# Patient Record
Sex: FEMALE | Race: WHITE | HISPANIC OR LATINO | URBAN - METROPOLITAN AREA
[De-identification: names, ages, dates, MRNs, and addresses within clinical notes are randomized per-mention and may not be internally consistent; named-entity substitution may affect disease eponyms.]

---

## 2022-03-23 ENCOUNTER — OFFICE VISIT (OUTPATIENT)
Dept: URGENT CARE | Facility: CLINIC | Age: 37
End: 2022-03-23
Payer: COMMERCIAL

## 2022-03-23 VITALS
RESPIRATION RATE: 16 BRPM | TEMPERATURE: 97.3 F | SYSTOLIC BLOOD PRESSURE: 117 MMHG | HEART RATE: 91 BPM | OXYGEN SATURATION: 100 % | BODY MASS INDEX: 21.51 KG/M2 | WEIGHT: 126 LBS | HEIGHT: 64 IN | DIASTOLIC BLOOD PRESSURE: 75 MMHG

## 2022-03-23 DIAGNOSIS — H61.23 BILATERAL HEARING LOSS DUE TO CERUMEN IMPACTION: Primary | ICD-10-CM

## 2022-03-23 PROCEDURE — 99203 OFFICE O/P NEW LOW 30 MIN: CPT | Performed by: FAMILY MEDICINE

## 2022-03-23 RX ORDER — SUMATRIPTAN 25 MG/1
25 TABLET, FILM COATED ORAL ONCE AS NEEDED
COMMUNITY

## 2022-03-23 RX ORDER — NORETHINDRONE ACETATE AND ETHINYL ESTRADIOL 1; 5 MG/1; UG/1
TABLET ORAL DAILY
COMMUNITY

## 2022-03-23 NOTE — PROGRESS NOTES
[facility]        NAME: Lucian Sanders is a 43 y o  female  : 1985    MRN: [id number]  DATE: 2022  TIME: 9:35 AM    Assessment and Plan   Bilateral hearing loss due to cerumen impaction [H61 23]  1  Bilateral hearing loss due to cerumen impaction       Ear cerumen removal    Date/Time: 3/23/2022 9:36 AM  Performed by: Adam Milian MD  Authorized by: Adam Milian MD   Universal Protocol:  Consent: Verbal consent obtained  Risks and benefits: risks, benefits and alternatives were discussed  Consent given by: patient  Time out: Immediately prior to procedure a "time out" was called to verify the correct patient, procedure, equipment, support staff and site/side marked as required  Patient understanding: patient states understanding of the procedure being performed  Patient consent: the patient's understanding of the procedure matches consent given  Required items: required blood products, implants, devices, and special equipment available  Patient identity confirmed: verbally with patient      Patient location:  Clinic  Procedure details:     Local anesthetic:  None    Procedure type: irrigation only      Approach:  External  Post-procedure details:     Complication:  None    Hearing quality:  Improved    Patient tolerance of procedure: Tolerated with difficulty (Experienced some dizziness )  Comments:      Advised on using Debrox in the future  Patient Instructions     Follow up with PCP in 3-5 days  Proceed to  ER if symptoms worsen  Chief Complaint     Chief Complaint   Patient presents with    Earache     R ear painful, itchy pressure for 3 days  Hearing is diminished in the ear  History of Present Illness       26-year-old female presents today with 3 days of right ear discomfort  Is concern for possible cerumen impaction  Denies any dizziness, fever, chills or headaches        Review of Systems   Review of Systems   Constitutional: Negative for chills and fever  HENT: Positive for ear pain and sneezing  Negative for congestion, rhinorrhea and sore throat  Respiratory: Negative for cough, shortness of breath and wheezing  Cardiovascular: Negative for chest pain  Gastrointestinal: Negative for abdominal pain and nausea  Musculoskeletal: Negative for arthralgias  Skin: Negative for rash  Allergic/Immunologic: Positive for environmental allergies  Neurological: Negative for dizziness and headaches  Current Medications       Current Outpatient Medications:     norethindrone-ethinyl estradiol (FEMHRT 1/5) 1-5 MG-MCG TABS, Take by mouth daily, Disp: , Rfl:     SUMAtriptan (IMITREX) 25 mg tablet, Take 25 mg by mouth once as needed for migraine, Disp: , Rfl:     Current Allergies     Allergies as of 03/23/2022    (No Known Allergies)            The following portions of the patient's history were reviewed and updated as appropriate: allergies, current medications, past family history, past medical history, past social history, past surgical history and problem list      Past Medical History:   Diagnosis Date    Allergic rhinitis     Anxiety        Past Surgical History:   Procedure Laterality Date    NO PAST SURGERIES         History reviewed  No pertinent family history  Medications have been verified  Objective   /75   Pulse 91   Temp (!) 97 3 °F (36 3 °C)   Resp 16   Ht 5' 4" (1 626 m)   Wt 57 2 kg (126 lb)   LMP 03/16/2022   SpO2 100%   BMI 21 63 kg/m²   Patient's last menstrual period was 03/16/2022  Physical Exam     Physical Exam  Vitals and nursing note reviewed  Constitutional:       General: She is in acute distress  Appearance: Normal appearance  She is normal weight  She is not ill-appearing, toxic-appearing or diaphoretic  HENT:      Head: Normocephalic and atraumatic  Right Ear: Ear canal and external ear normal  There is impacted cerumen        Left Ear: Ear canal and external ear normal  There is impacted cerumen  Eyes:      General:         Right eye: No discharge  Left eye: No discharge  Conjunctiva/sclera: Conjunctivae normal    Pulmonary:      Effort: Pulmonary effort is normal    Skin:     General: Skin is warm  Findings: No erythema  Neurological:      General: No focal deficit present  Mental Status: She is alert and oriented to person, place, and time  Psychiatric:         Mood and Affect: Mood normal          Behavior: Behavior normal          Thought Content:  Thought content normal          Judgment: Judgment normal

## 2022-03-28 ENCOUNTER — OFFICE VISIT (OUTPATIENT)
Dept: OTOLARYNGOLOGY | Facility: CLINIC | Age: 37
End: 2022-03-28
Payer: COMMERCIAL

## 2022-03-28 VITALS
HEIGHT: 64 IN | DIASTOLIC BLOOD PRESSURE: 74 MMHG | SYSTOLIC BLOOD PRESSURE: 112 MMHG | OXYGEN SATURATION: 100 % | WEIGHT: 125 LBS | TEMPERATURE: 97.9 F | HEART RATE: 98 BPM | BODY MASS INDEX: 21.34 KG/M2

## 2022-03-28 DIAGNOSIS — H61.22 LEFT EAR IMPACTED CERUMEN: Primary | ICD-10-CM

## 2022-03-28 PROCEDURE — 69210 REMOVE IMPACTED EAR WAX UNI: CPT | Performed by: NURSE PRACTITIONER

## 2022-03-28 PROCEDURE — 99203 OFFICE O/P NEW LOW 30 MIN: CPT | Performed by: NURSE PRACTITIONER

## 2022-03-28 NOTE — ASSESSMENT & PLAN NOTE
On exam noted left cerumen impaction and unable to fully view tympanic membrane  Cerumen impaction removed left eac with irrigation and suction, pt tolerated procedure well  Upon removal, improved hearing and decreased clogged sensation of bilateral ears  Discussed routine cerumen care including avoidance of q-tips and cerumen softeners  Debrox (ear wax softener) once every 2 to 3 months  Hydrocortisone cream or coconut oil to ears as needed for itching  Encourage ongoing follow up prn to monitor for cerumen and hearing  Audiogram if symptoms worsen

## 2022-03-28 NOTE — PROGRESS NOTES
Assessment/Plan:    Left ear impacted cerumen    On exam noted left cerumen impaction and unable to fully view tympanic membrane  Cerumen impaction removed left eac with irrigation and suction, pt tolerated procedure well  Upon removal, improved hearing and decreased clogged sensation of bilateral ears  Discussed routine cerumen care including avoidance of q-tips and cerumen softeners  Debrox (ear wax softener) once every 2 to 3 months  Hydrocortisone cream or coconut oil to ears as needed for itching  Encourage ongoing follow up prn to monitor for cerumen and hearing  Audiogram if symptoms worsen  Diagnoses and all orders for this visit:    Left ear impacted cerumen    Other orders  -     Ear cerumen removal          Subjective:      Patient ID: Antoni Christian is a 43 y o  female  Presents today as a new patient due to ear concerns  About a week ago both ears  Clogged, itching, ringing  Right worse than left  Sought treatment at urgent care  Ears cleaned during that visit  During cleaning of left ear became dizzy and had pain  Right ear is improved after cleaning but left remains bothersome  No treatment with ear drops after visit  No current ear pain  No otorrhea  No current dizziness  The following portions of the patient's history were reviewed and updated as appropriate: allergies, current medications, past family history, past medical history, past social history, past surgical history and problem list     Review of Systems   Constitutional: Negative  HENT: Negative for congestion, ear discharge, ear pain, hearing loss, nosebleeds, postnasal drip, rhinorrhea, sinus pressure, sinus pain, sore throat, tinnitus and voice change  Ear blocked     Eyes: Negative  Respiratory: Negative for chest tightness and shortness of breath  Cardiovascular: Negative  Gastrointestinal: Negative  Endocrine: Negative  Musculoskeletal: Negative      Skin: Negative for color change  Neurological: Negative for dizziness, numbness and headaches  Psychiatric/Behavioral: Negative  Objective:      /74 (BP Location: Left arm, Patient Position: Sitting, Cuff Size: Adult)   Pulse 98   Temp 97 9 °F (36 6 °C) (Temporal)   Ht 5' 4" (1 626 m)   Wt 56 7 kg (125 lb)   LMP 03/16/2022   SpO2 100%   BMI 21 46 kg/m²          Physical Exam  Constitutional:       Appearance: She is well-developed  HENT:      Head: Normocephalic  Right Ear: Hearing, tympanic membrane, ear canal and external ear normal  No decreased hearing noted  No drainage or tenderness  Tympanic membrane is not perforated or erythematous  Left Ear: Hearing, tympanic membrane, ear canal and external ear normal  No decreased hearing noted  No drainage or tenderness  There is impacted cerumen  Tympanic membrane is not perforated or erythematous  Nose: Nose normal  No nasal deformity or septal deviation  Mouth/Throat:      Mouth: Mucous membranes are not pale and not dry  No oral lesions  Dentition: Normal dentition  Pharynx: Uvula midline  No oropharyngeal exudate  Neck:      Trachea: No tracheal deviation  Cardiovascular:      Rate and Rhythm: Normal rate  Pulmonary:      Effort: Pulmonary effort is normal  No accessory muscle usage or respiratory distress  Musculoskeletal:      Right shoulder: Normal range of motion  Cervical back: Full passive range of motion without pain, normal range of motion and neck supple  Lymphadenopathy:      Cervical: No cervical adenopathy  Skin:     General: Skin is warm and dry  Neurological:      Mental Status: She is alert and oriented to person, place, and time  Cranial Nerves: No cranial nerve deficit  Sensory: No sensory deficit  Psychiatric:         Behavior: Behavior is cooperative         Ear cerumen removal    Date/Time: 3/28/2022 10:45 AM  Performed by: CAROLINA Dale  Authorized by: Shea Holm Universal Protocol:  Consent: Verbal consent obtained  Risks and benefits: risks, benefits and alternatives were discussed  Consent given by: patient  Patient understanding: patient states understanding of the procedure being performed      Patient location:  Clinic  Procedure details:     Local anesthetic:  None    Location:  L ear    Procedure type: irrigation with instrumentation      Instrumentation: suction      Approach:  External  Post-procedure details:     Complication:  None    Hearing quality:  Normal    Patient tolerance of procedure:   Tolerated well, no immediate complications

## 2022-03-28 NOTE — PATIENT INSTRUCTIONS
Debrox (ear wax softener) once every 2 to 3 months  Hydrocortisone cream or coconut oil to ears as needed for itching

## 2022-07-05 ENCOUNTER — OFFICE VISIT (OUTPATIENT)
Dept: PHYSICAL THERAPY | Facility: CLINIC | Age: 37
End: 2022-07-05
Payer: COMMERCIAL

## 2022-07-05 DIAGNOSIS — M54.12 CERVICAL RADICULOPATHY: Primary | ICD-10-CM

## 2022-07-05 DIAGNOSIS — M25.512 ACUTE PAIN OF LEFT SHOULDER: ICD-10-CM

## 2022-07-05 PROCEDURE — 97161 PT EVAL LOW COMPLEX 20 MIN: CPT

## 2022-07-05 PROCEDURE — 97161 PT EVAL LOW COMPLEX 20 MIN: CPT | Performed by: PHYSICAL MEDICINE & REHABILITATION

## 2022-07-05 NOTE — PROGRESS NOTES
PT Evaluation     Today's date: 2022  Patient name: Abiel Ham  : 1985  MRN: [id number]  Referring provider: No ref  provider found  Dx: No diagnosis found  Assessment  Assessment details: Abiel Ham is an 42 y o  female  arriving to clinic with complaints of left shoulder pain  Patient's primary symptoms are present along medial scapular border and intermittently into posterior capsule of left shoulder  Patient not present with any significant weakness of R/L UEs (-) special test clusters of left shoulder, and denies any numbness or paraesthesia  Symptoms elicited with cervical extension, and right cervical lateral flexion  Patient did have a reduction of symptoms with repeated cervical retraction, and manual traction of cervical spine  Mid/lower trap with moderate weakness bilaterally, (+) radial nerve tension in LLE present  Due to current deficits, patient is limited functionally with recreational activities, work-related activities, lifting/carrying  Patient has been educated in home exercise program and plan of care  Patient would benefit from skilled physical therapy services to address their aforementioned functional limitations and progress towards prior level of function and independence with home exercise program     Impairments: activity intolerance, lacks appropriate home exercise program, pain with function, scapular dyskinesis, poor posture  and poor body mechanics    Symptom irritability: moderate  Goals  Short Term Goals:  4 weeks  1  Initiate and advance home exercise program to self manage symptoms  2  Improve postural awareness and demonstrate self postural correction  3  Patient can work for 30 minutes without increased pain in left shoulder  4  Patient to return to yoga with moderation when necessary    Long Term Goals:  12 weeks  1  Patient to exhibit full independence with home exercise program for symptoms relief and prevention   2   Patient to improve mid/lower trap strength to 4/5 bilaterally  3  Patient to return to yoga without restriction  4  Patient to improve cervical mobility to no deficit without increased pain  Plan  Patient would benefit from: skilled physical therapy  Planned modality interventions: TENS, unattended electrical stimulation, thermotherapy: hydrocollator packs and cryotherapy  Planned therapy interventions: abdominal trunk stabilization, flexibility, functional ROM exercises, home exercise program, therapeutic exercise, therapeutic activities, stretching, strengthening, self care, postural training, patient education, neuromuscular re-education, massage, manual therapy and joint mobilization  Frequency: 2x week  Plan of Care expiration date: 2022  Treatment plan discussed with: patient        Subjective Evaluation    History of Present Illness  Mechanism of injury: Patient reports about a month ago she was trying to move heavy bins in her house, at one instance patient reports she lifted one heavy box causing increased posterior shoulder pain  Patient notes symptoms improved a bit since the incident  Patient notes she did not follow up with her MD, but did has tried yoga stretches for her shoulder and that helped a little bit  Patient notes symptoms are worst in shoulder when weight bearing position, and with reaching forward  Patient notes she works remotely and does not feel any increased pain when doing computer work            Not a recurrent problem   Pain  Current pain ratin  At best pain ratin  At worst pain ratin  Location: Left posterior shoulder   Quality: discomfort and dull ache  Progression: improved    Hand dominance: right      Diagnostic Tests  No diagnostic tests performed  Treatments  No previous or current treatments  Patient Goals  Patient goals for therapy: decreased pain  Patient goal: Return to full yoga        Objective  (* denotes pain)    C-SPINE:  Increased symptoms with cervical extension and right cervical side bending  Decreased pain into left medial scapular boarder with repeated cervical retractions  (+) radial nerve tension test  (+) cervical traction test    MMT:    Right  Left    Shoulder flexion:  5/5  5/5  Shoulder abduction:  5/5  5/5*  Shoulder extension:  5/5  5/5  Shoulder IR neutral:  5/5  5/5*  Shoulder ER neutral:  5/5  5/5  Mid traps:   3+/5  3+/5*  Lower traps:    3+/5  3+/5    AROM:    Right  Left    Shoulder flexion:  180  180  Shoulder abduction:  180  180  Shoulder extension:  60  60  Shoulder IR:    T5  T7  Shoulder ER:   T1  T1    EDEMA:  Not present in left shoulder       PALPATION:  (+) tenderness to palpation to left medial scapular boarder     FLEXIBILITY:  No significant flexibility deficits    SPECIAL TESTS:    Impingement cluster: (3/5 or 1-3)  4-Ckthuvo-Dbqbebe: (-)  2-External rotation resistance: (-)  3-Painful arc: (-)  4-Empty can: (-)  5-Neer: (-)    RTC full thickness tear cluster: (3/3)  Painful arc: (-)  Drop arm: (-)  External rotation resistance: (-)    Others:  Sulcus: (-)  O'Brians active compression: (-)  Jobes relocation test: (-)  Apprehension test: (-)           Precautions:   Past Medical History:   Diagnosis Date    Allergic rhinitis     Anxiety          POC exp: 09/27/2022    Date: 07/05/2022       Visit # 1       Manuals        Manual traction         Chin tucks with manual traction                        Neuro Re-Ed        Rows        Shoulder extension        Wall stars        Serratus roll ups        Radial nerve glides                        Ther Ex        Cervical retractions        Thoracic extensions against towel roll        Supine angels                                                Ther Activity                        Gait Training                        Modalities                          Access Code: [email or web address]  URL: [email or web address]  Date: 07/05/2022  Prepared by: Padmaja Montes    Exercises  · Seated Passive Cervical Retraction - 4 x daily - 7 x weekly - 2 sets - 10 reps  · Seated Scapular Retraction - 4 x daily - 7 x weekly - 2 sets - 10 reps  · Thoracic Extension Mobilization on Foam Roll - 2 x daily - 7 x weekly - 1 sets - 10 reps - 10 seconds hold  · Wall Busby - 2 x daily - 7 x weekly - 2 sets - 10 reps

## 2022-07-05 NOTE — LETTER
2022    Emerson Mane PA-C  [address]  [id number]    Patient: Antoni Christian   YOB: 1985   Date of Visit: 2022     Encounter Diagnosis     ICD-10-CM    1  Cervical radiculopathy  M54 12    2  Acute pain of left shoulder  M25 512        Dear Dr Alvarado Found: Thank you for your recent referral of Antoni Christian  Please review the attached evaluation summary from Denia's recent visit  Please verify that you agree with the plan of care by signing the attached order  If you have any questions or concerns, please do not hesitate to call  I sincerely appreciate the opportunity to share in the care of one of your patients and hope to have another opportunity to work with you in the near future  Sincerely,    Manjula Oakley, PT      Referring Provider:      I certify that I have read the below Plan of Care and certify the need for these services furnished under this plan of treatment while under my care  Ravin Aguirre PA-C  [address] [id number]  Via Fax: [phone]          PT Evaluation     Today's date: 2022  Patient name: Antoni Christian  : 1985  MRN: [id number]  Referring provider: No ref  provider found  Dx: No diagnosis found  Assessment  Assessment details: Antoni Christian is an 42 y o  female  arriving to clinic with complaints of left shoulder pain  Patient's primary symptoms are present along medial scapular border and intermittently into posterior capsule of left shoulder  Patient not present with any significant weakness of R/L UEs (-) special test clusters of left shoulder, and denies any numbness or paraesthesia  Symptoms elicited with cervical extension, and right cervical lateral flexion  Patient did have a reduction of symptoms with repeated cervical retraction, and manual traction of cervical spine   Mid/lower trap with moderate weakness bilaterally, (+) radial nerve tension in LLE present  Due to current deficits, patient is limited functionally with recreational activities, work-related activities, lifting/carrying  Patient has been educated in home exercise program and plan of care  Patient would benefit from skilled physical therapy services to address their aforementioned functional limitations and progress towards prior level of function and independence with home exercise program     Impairments: activity intolerance, lacks appropriate home exercise program, pain with function, scapular dyskinesis, poor posture  and poor body mechanics    Symptom irritability: moderate  Goals  Short Term Goals:  4 weeks  1  Initiate and advance home exercise program to self manage symptoms  2  Improve postural awareness and demonstrate self postural correction  3  Patient can work for 30 minutes without increased pain in left shoulder  4  Patient to return to yoga with moderation when necessary    Long Term Goals:  12 weeks  1  Patient to exhibit full independence with home exercise program for symptoms relief and prevention   2  Patient to improve mid/lower trap strength to 4/5 bilaterally  3  Patient to return to yoga without restriction  4  Patient to improve cervical mobility to no deficit without increased pain      Plan  Patient would benefit from: skilled physical therapy  Planned modality interventions: TENS, unattended electrical stimulation, thermotherapy: hydrocollator packs and cryotherapy  Planned therapy interventions: abdominal trunk stabilization, flexibility, functional ROM exercises, home exercise program, therapeutic exercise, therapeutic activities, stretching, strengthening, self care, postural training, patient education, neuromuscular re-education, massage, manual therapy and joint mobilization  Frequency: 2x week  Plan of Care expiration date: 9/27/2022  Treatment plan discussed with: patient        Subjective Evaluation    History of Present Illness  Mechanism of injury: Patient reports about a month ago she was trying to move heavy bins in her house, at one instance patient reports she lifted one heavy box causing increased posterior shoulder pain  Patient notes symptoms improved a bit since the incident  Patient notes she did not follow up with her MD, but did has tried yoga stretches for her shoulder and that helped a little bit  Patient notes symptoms are worst in shoulder when weight bearing position, and with reaching forward  Patient notes she works remotely and does not feel any increased pain when doing computer work            Not a recurrent problem   Pain  Current pain ratin  At best pain ratin  At worst pain ratin  Location: Left posterior shoulder   Quality: discomfort and dull ache  Progression: improved    Hand dominance: right      Diagnostic Tests  No diagnostic tests performed  Treatments  No previous or current treatments  Patient Goals  Patient goals for therapy: decreased pain  Patient goal: Return to full yoga        Objective  (* denotes pain)    C-SPINE:  Increased symptoms with cervical extension and right cervical side bending  Decreased pain into left medial scapular boarder with repeated cervical retractions  (+) radial nerve tension test  (+) cervical traction test    MMT:    Right  Left    Shoulder flexion:  5/5  5/5  Shoulder abduction:  5/5  5/5*  Shoulder extension:  5/5  5/5  Shoulder IR neutral:  5/5  5/5*  Shoulder ER neutral:  5/5  5/5  Mid traps:   3+/5  3+/5*  Lower traps:    3+/5  3+/5    AROM:    Right  Left    Shoulder flexion:  180  180  Shoulder abduction:  180  180  Shoulder extension:  60  60  Shoulder IR:    T5  T7  Shoulder ER:   T1  T1    EDEMA:  Not present in left shoulder       PALPATION:  (+) tenderness to palpation to left medial scapular boarder     FLEXIBILITY:  No significant flexibility deficits    SPECIAL TESTS:    Impingement cluster: (3/5 or 1-3)  8-Zrejewg-Jpcimou: (-)  2-External rotation resistance: (-)  3-Painful arc: (-)  4-Empty can: (-)  5-Neer: (-)    RTC full thickness tear cluster: (3/3)  Painful arc: (-)  Drop arm: (-)  External rotation resistance: (-)    Others:  Sulcus: (-)  O'Brians active compression: (-)  Jobes relocation test: (-)  Apprehension test: (-)           Precautions:   Past Medical History:   Diagnosis Date    Allergic rhinitis     Anxiety          POC exp: 09/27/2022    Date: 07/05/2022       Visit # 1       Manuals        Manual traction         Chin tucks with manual traction                        Neuro Re-Ed        Rows        Shoulder extension        Wall stars        Serratus roll ups        Radial nerve glides                        Ther Ex        Cervical retractions        Thoracic extensions against towel roll        Supine angels                                                Ther Activity                        Gait Training                        Modalities                          Access Code: [email or web address]  URL: [email or web address]  Date: 07/05/2022  Prepared by: Teresa Azeem    Exercises  · Seated Passive Cervical Retraction - 4 x daily - 7 x weekly - 2 sets - 10 reps  · Seated Scapular Retraction - 4 x daily - 7 x weekly - 2 sets - 10 reps  · Thoracic Extension Mobilization on Foam Roll - 2 x daily - 7 x weekly - 1 sets - 10 reps - 10 seconds hold  · Wall Enemy Swim - 2 x daily - 7 x weekly - 2 sets - 10 reps

## 2022-07-13 ENCOUNTER — OFFICE VISIT (OUTPATIENT)
Dept: PHYSICAL THERAPY | Facility: CLINIC | Age: 37
End: 2022-07-13
Payer: COMMERCIAL

## 2022-07-13 DIAGNOSIS — M54.12 CERVICAL RADICULOPATHY: Primary | ICD-10-CM

## 2022-07-13 DIAGNOSIS — M25.512 ACUTE PAIN OF LEFT SHOULDER: ICD-10-CM

## 2022-07-13 PROCEDURE — 97140 MANUAL THERAPY 1/> REGIONS: CPT | Performed by: PHYSICAL MEDICINE & REHABILITATION

## 2022-07-13 PROCEDURE — 97140 MANUAL THERAPY 1/> REGIONS: CPT

## 2022-07-13 PROCEDURE — 97110 THERAPEUTIC EXERCISES: CPT

## 2022-07-13 PROCEDURE — 97110 THERAPEUTIC EXERCISES: CPT | Performed by: PHYSICAL MEDICINE & REHABILITATION

## 2022-07-13 PROCEDURE — 97112 NEUROMUSCULAR REEDUCATION: CPT | Performed by: PHYSICAL MEDICINE & REHABILITATION

## 2022-07-13 PROCEDURE — 97112 NEUROMUSCULAR REEDUCATION: CPT

## 2022-07-13 NOTE — PROGRESS NOTES
Daily Note     Today's date: 2022  Patient name: Hanh Chang  : 1985  MRN: [id number]  Referring provider: Self, Referral  Dx:   Encounter Diagnosis     ICD-10-CM    1  Cervical radiculopathy  M54 12    2  Acute pain of left shoulder  M25 512                   Subjective: Patient reports she has focused quite a bit on her ergonomic set up when working from home and that has given her improvements in overall symptoms  Patient notes that she has been trying to set timers for a reminder to re-set her posture  Objective: See treatment diary below      Assessment: Tolerated treatment well  Patient shows good compliance to HEP as pain has been very well managed independently  Educated patient on POC if patient feels she is able to self manage well with HEP and ergonomic set up we can schedule discharge visit  Patient exhibited good technique with therapeutic exercises and would benefit from continued PT      Plan: Continue per plan of care  Precautions:   Past Medical History:   Diagnosis Date    Allergic rhinitis     Anxiety          POC exp: 2022    Date: 2022      Visit # 1 2      Manuals  10'      Manual traction   TC      Chin tucks with manual traction  15x                       Neuro Re-Ed  20      Rows  12 5 2x10      Shoulder extension  #6 2x10 R/L      Wall stars  YTB 15x      Serratus roll ups  YTB 15x      Radial nerve glides  L 2x10                      Ther Ex  10      Cervical retractions  2x10 with patient OP      Thoracic extensions against towel roll  5" holds x10      Supine angels  20x no pillow                                              Ther Activity                        Gait Training                        Modalities                          Access Code: [email or web address]  URL: [email or web address]  Date: 2022  Prepared by: Canelo Sesay    Exercises  · Seated Passive Cervical Retraction - 4 x daily - 7 x weekly - 2 sets - 10 reps  · Seated Scapular Retraction - 4 x daily - 7 x weekly - 2 sets - 10 reps  · Thoracic Extension Mobilization on Foam Roll - 2 x daily - 7 x weekly - 1 sets - 10 reps - 10 seconds hold  · Wall [place] - 2 x daily - 7 x weekly - 2 sets - 10 reps

## 2022-07-18 ENCOUNTER — OFFICE VISIT (OUTPATIENT)
Dept: PHYSICAL THERAPY | Facility: CLINIC | Age: 37
End: 2022-07-18
Payer: COMMERCIAL

## 2022-07-18 DIAGNOSIS — M25.512 ACUTE PAIN OF LEFT SHOULDER: ICD-10-CM

## 2022-07-18 DIAGNOSIS — M54.12 CERVICAL RADICULOPATHY: Primary | ICD-10-CM

## 2022-07-18 PROCEDURE — 97110 THERAPEUTIC EXERCISES: CPT

## 2022-07-18 PROCEDURE — 97140 MANUAL THERAPY 1/> REGIONS: CPT

## 2022-07-18 NOTE — PROGRESS NOTES
Daily Note     Today's date: 2022  Patient name: Celio Mathew  : 1985  MRN: [id number]  Referring provider: Self, Referral  Dx:   Encounter Diagnosis     ICD-10-CM    1  Cervical radiculopathy  M54 12    2  Acute pain of left shoulder  M25 512        Start Time:   Stop Time:   Total time in clinic (min): 45 minutes    Subjective: Patient arrives reporting compliance with HEP, but inc pain with cervical retraction into L levator scap and rhomboids, as well as L teres minor  Pain rated 6/10 on arrival along L scapular border  Objective: See treatment diary below      Assessment: Tolerated treatment well and with decreased pain along scapular border  Noted to have scapulohumeral dysfunction in R shoulder, with some improvement noted with manual cues for scapular stabilization  Significant hypermobility noted throught UE and spine  Patient exhibited good technique with therapeutic exercises and would benefit from continued PT to continue to address cervical and thoracic spine, strength, scapular stabilization, and decreasing pain  Plan: Continue per plan of care  Progress treatment as tolerated  Precautions:   Past Medical History:   Diagnosis Date    Allergic rhinitis     Anxiety          POC exp: 2022    Date: 2022     Visit # 1 2 3     Manuals  10' 10'     Manual traction   TC LS     Chin tucks with manual traction  15x       Manual release/IASTM   LS to paraspinals/teres minor             Neuro Re-Ed  20 5'     Rows  12 5 2x10      Shoulder extension  #6 2x10 R/L      Wall stars  YTB 15x      Serratus roll ups  YTB 15x      Radial nerve glides  L 2x10      S/L shld abd   scap stab cues 2x10, dec ease of              Ther Ex  10 25'     Cervical retractions  2x10 with patient OP In REAL 2x10, self OP      Thoracic extensions against towel roll  5" holds x10 Over cane, performed 2x10 in sitting, dec central symptoms   No change to symptoms in teres minor area     Supine angels  20x no pillow 20x no pillow      Wall slides   10x10"     Post capsule stretch   5x15" L     Std abd stretch w/ cane   R UE 2x10 w/ left trunk lean at end range                     Ther Activity                        Gait Training                        Modalities                          Access Code: [email or web address]  URL: [email or web address]  Date: 07/05/2022  Prepared by: Kane Hernandez    Exercises  · Seated Passive Cervical Retraction - 4 x daily - 7 x weekly - 2 sets - 10 reps  · Seated Scapular Retraction - 4 x daily - 7 x weekly - 2 sets - 10 reps  · Thoracic Extension Mobilization on Foam Roll - 2 x daily - 7 x weekly - 1 sets - 10 reps - 10 seconds hold  · Wall [place] - 2 x daily - 7 x weekly - 2 sets - 10 reps

## 2022-07-19 ENCOUNTER — APPOINTMENT (OUTPATIENT)
Dept: PHYSICAL THERAPY | Facility: CLINIC | Age: 37
End: 2022-07-19
Payer: COMMERCIAL

## 2022-07-21 ENCOUNTER — OFFICE VISIT (OUTPATIENT)
Dept: PHYSICAL THERAPY | Facility: CLINIC | Age: 37
End: 2022-07-21
Payer: COMMERCIAL

## 2022-07-21 DIAGNOSIS — M54.12 CERVICAL RADICULOPATHY: Primary | ICD-10-CM

## 2022-07-21 DIAGNOSIS — M25.512 ACUTE PAIN OF LEFT SHOULDER: ICD-10-CM

## 2022-07-21 PROCEDURE — 97110 THERAPEUTIC EXERCISES: CPT

## 2022-07-21 PROCEDURE — 97140 MANUAL THERAPY 1/> REGIONS: CPT

## 2022-07-21 NOTE — PROGRESS NOTES
Daily Note     Today's date: 2022  Patient name: Sonam Mcclelland  : 1985  MRN: [id number]  Referring provider: Key Mcconnell PA-C  Dx:   Encounter Diagnosis     ICD-10-CM    1  Cervical radiculopathy  M54 12    2  Acute pain of left shoulder  M25 512                   Subjective: Patient reports bilateral mid back pain today but feels improved compared to last session  Notes L infrascap/lat scap border discomfort  Objective: See treatment diary below  Mechanical Assessment:   T/S ext over MB: Dec/B -improved mid back pain but increased cervical tension  Cerv retra: Inc/W -inc L scap pain   Cerv retra/ext: Inc/W -inc cerv tension  Cerv L side bend: dec/B tension in cerv spine      Assessment: Tolerated treatment well  Demo dec pain w/ combination of L cervical side bending and thoracic ext over MB in sitting  Req inc force w/ PA mobs to T/S to help reduce L lat scap pain  Patient exhibited good technique with therapeutic exercises and would benefit from continued PT to continue to address cervical and thoracic spine, strength, scapular stabilization, and decreasing pain  Updated HEP to reflect her response to today's session  Plan: Continue per plan of care  Progress treatment as tolerated         Precautions:   Past Medical History:   Diagnosis Date    Allergic rhinitis     Anxiety          POC exp: 2022    Date: 2022    Visit # 1 2 3 4    Manuals  10' 10' 10'    Manual traction   TC LS     Chin tucks with manual traction  15x       Manual release/IASTM   LS to paraspinals/teres minor Teres minor rel in prone 5'        PA mobs T/S 5'     Neuro Re-Ed  20 5'     Rows  12 5 2x10      Shoulder extension  #6 2x10 R/L      Wall stars  YTB 15x      Serratus roll ups  YTB 15x      Radial nerve glides  L 2x10      S/L shld abd   scap stab cues 2x10, dec ease of              Ther Ex  10 25' 30'    Cervical retractions  2x10 with patient OP In REAL 2x10, self OP      Thoracic extensions against towel roll  5" holds x10 Over cane, performed 2x10 in sitting, dec central symptoms  No change to symptoms in teres minor area Seated w/ MB 2x10  Dec/B -improved resting bilateral thoracic pain    Supine angels  20x no pillow 20x no pillow      Wall slides   10x10" 10"x10     Post capsule stretch   5x15" L     Std abd stretch w/ cane   R UE 2x10 w/ left trunk lean at end range 1x10 5"        Mechanical Assessment             Ther Activity                        Gait Training                        Modalities                          Access Code: [email or web address]  URL: [email or web address]  Date: 07/05/2022  Prepared by: ta Gaunt    Exercises  · Seated Passive Cervical Retraction - 4 x daily - 7 x weekly - 2 sets - 10 reps  · Seated Scapular Retraction - 4 x daily - 7 x weekly - 2 sets - 10 reps  · Thoracic Extension Mobilization on Foam Roll - 2 x daily - 7 x weekly - 1 sets - 10 reps - 10 seconds hold  · Wall [place] - 2 x daily - 7 x weekly - 2 sets - 10 reps

## 2022-07-27 ENCOUNTER — APPOINTMENT (OUTPATIENT)
Dept: PHYSICAL THERAPY | Facility: CLINIC | Age: 37
End: 2022-07-27
Payer: COMMERCIAL

## 2022-08-01 ENCOUNTER — TELEPHONE (OUTPATIENT)
Dept: PHYSICAL THERAPY | Facility: CLINIC | Age: 37
End: 2022-08-01

## 2022-08-01 ENCOUNTER — APPOINTMENT (OUTPATIENT)
Dept: PHYSICAL THERAPY | Facility: CLINIC | Age: 37
End: 2022-08-01
Payer: COMMERCIAL

## 2022-08-01 NOTE — TELEPHONE ENCOUNTER
Patient called and spoke with [facility] stating she was unable to attend tonight's appointment  Reported to [address] [place] [address] she is feeling good and is unsure if she needs to continue PT  Will have primary PT follow-up      Jb Almanza, PT, MS, NCS  ABPTS Neurologic Certified Specialist  [place] Licence #[id number]

## 2022-08-10 ENCOUNTER — OFFICE VISIT (OUTPATIENT)
Dept: PHYSICAL THERAPY | Facility: CLINIC | Age: 37
End: 2022-08-10
Payer: COMMERCIAL

## 2022-08-10 DIAGNOSIS — M54.12 CERVICAL RADICULOPATHY: Primary | ICD-10-CM

## 2022-08-10 DIAGNOSIS — M25.512 ACUTE PAIN OF LEFT SHOULDER: ICD-10-CM

## 2022-08-10 PROCEDURE — 97110 THERAPEUTIC EXERCISES: CPT | Performed by: PHYSICAL MEDICINE & REHABILITATION

## 2022-08-10 PROCEDURE — 97112 NEUROMUSCULAR REEDUCATION: CPT | Performed by: PHYSICAL MEDICINE & REHABILITATION

## 2022-08-10 PROCEDURE — 97112 NEUROMUSCULAR REEDUCATION: CPT

## 2022-08-10 PROCEDURE — 97110 THERAPEUTIC EXERCISES: CPT

## 2022-08-10 NOTE — PROGRESS NOTES
PT Discharge    Today's date: 8/10/2022  Patient name: Charu Ortez  : 1985  MRN: [id number]  Referring provider: Self, Referral  Dx:   Encounter Diagnosis     ICD-10-CM    1  Cervical radiculopathy  M54 12    2  Acute pain of left shoulder  M25 512                   Assessment  Assessment details: 08/10/2022: Since initial evaluation patient has exhibited good improvement in pain free range of motion as well as cervical/thoracic mobility  Patient has been consistent with attendance to therapy, motivated during each treatment session, and shows compliance with HEP  At this time patient is appropriate for discharge with updated comprehensive HEP  Initial evaluation: Charu Ortez is an 42 y o  female  arriving to clinic with complaints of left shoulder pain  Patient's primary symptoms are present along medial scapular border and intermittently into posterior capsule of left shoulder  Patient not present with any significant weakness of R/L UEs (-) special test clusters of left shoulder, and denies any numbness or paraesthesia  Symptoms elicited with cervical extension, and right cervical lateral flexion  Patient did have a reduction of symptoms with repeated cervical retraction, and manual traction of cervical spine  Mid/lower trap with moderate weakness bilaterally, (+) radial nerve tension in LLE present  Due to current deficits, patient is limited functionally with recreational activities, work-related activities, lifting/carrying  Patient has been educated in home exercise program and plan of care  Patient would benefit from skilled physical therapy services to address their aforementioned functional limitations and progress towards prior level of function and independence with home exercise program       Goals  Short Term Goals:  4 weeks  1  Initiate and advance home exercise program to self manage symptoms  2  Improve postural awareness and demonstrate self postural correction    3  Patient can work for 30 minutes without increased pain in left shoulder  4  Patient to return to yoga with moderation when necessary    Long Term Goals:  12 weeks  1  Patient to exhibit full independence with home exercise program for symptoms relief and prevention   2  Patient to improve mid/lower trap strength to 4/5 bilaterally  3  Patient to return to yoga without restriction  4  Patient to improve cervical mobility to no deficit without increased pain  Plan  Planned therapy interventions: home exercise program  Frequency: 2x week  Treatment plan discussed with: patient        Subjective Evaluation    History of Present Illness  Mechanism of injury: Initial evaluation: Patient reports about a month ago she was trying to move heavy bins in her house, at one instance patient reports she lifted one heavy box causing increased posterior shoulder pain  Patient notes symptoms improved a bit since the incident  Patient notes she did not follow up with her MD, but did has tried yoga stretches for her shoulder and that helped a little bit  Patient notes symptoms are worst in shoulder when weight bearing position, and with reaching forward  Patient notes she works remotely and does not feel any increased pain when doing computer work     08/10/2022: Patient reports since initial evaluation she has felt significant improvements in symptoms, noting that she has been able to adjust her work station, and has focused on taking multiple breaks throughout the day to perform stretches  Patient notes she has been compliant with HEP and that has been most helpful to her  Patient states she is pleased with her progress and feels confident for discharge            Not a recurrent problem   Pain  Current pain ratin  At best pain ratin  At worst pain ratin  Location: Left posterior shoulder   Progression: improved    Hand dominance: right      Diagnostic Tests  No diagnostic tests performed  Treatments  No previous or current treatments  Patient Goals  Patient goal: Continue independently with HEP        Objective  (* denotes pain)    C-SPINE:  Increased symptoms with cervical extension and right cervical side bending  Decreased pain into left medial scapular boarder with repeated cervical retractions  (+) radial nerve tension test  (+) cervical traction test    MMT:    Right  Left    Shoulder flexion:  5/5  5/5  Shoulder abduction:  5/5  5/5*  Shoulder extension:  5/5  5/5  Shoulder IR neutral:  5/5  5/5  Shoulder ER neutral:  5/5  5/5  Mid traps:   3+/5  3+/5  Lower traps:    3+/5  3+/5    AROM:    Right  Left    Shoulder flexion:  180  180  Shoulder abduction:  180  180  Shoulder extension:  60  60  Shoulder IR:    T5  T7  Shoulder ER:   T1  T1    EDEMA:  Not present in left shoulder       PALPATION:  (-) tenderness to palpation to left medial scapular boarder     FLEXIBILITY:  No significant flexibility deficits    SPECIAL TESTS:    Impingement cluster: (3/5 or 1-3)  0-Jaleptj-Obwympq: (-)  2-External rotation resistance: (-)  3-Painful arc: (-)  4-Empty can: (-)  5-Neer: (-)    RTC full thickness tear cluster: (3/3)  Painful arc: (-)  Drop arm: (-)  External rotation resistance: (-)    Others:  Sulcus: (-)  O'Brians active compression: (-)  Jobes relocation test: (-)  Apprehension test: (-)           Precautions:   Past Medical History:   Diagnosis Date    Allergic rhinitis     Anxiety          POC exp: 09/27/2022    Date: 07/05/2022 07/13/2022 7/18/22 7/21/2022 08/10/2022   Visit # 1 2 3 4 5 D/C   Manuals  10' 10' 10'    Manual traction   TC LS     Chin tucks with manual traction  15x       Manual release/IASTM   LS to paraspinals/teres minor Teres minor rel in prone 5'        PA mobs T/S 5'  PA mobs T/S 5'    Neuro Re-Ed  20 5'     Rows  12 5 2x10   #12 5 at [employer] 2x10   Shoulder extension  #6 2x10 R/L   #6 at [employer] 2x10   Wall stars  YTB 15x   YTB 15x abd/diagonal   Serratus roll ups  YTB 15x      Radial nerve glides  L 2x10 S/L shld abd   scap stab cues 2x10, dec ease of              Ther Ex  10 25' 30'    Cervical retractions  2x10 with patient OP In REAL 2x10, self OP      Thoracic extensions against towel roll  5" holds x10 Over cane, performed 2x10 in sitting, dec central symptoms  No change to symptoms in teres minor area Seated w/ MB 2x10  Dec/B -improved resting bilateral thoracic pain Seated w/ MB 2x10  Dec/B -improved resting bilateral thoracic pain   Supine angels  20x no pillow 20x no pillow   20x no pillow   Wall slides   10x10" 10"x10  10"x10    Post capsule stretch   5x15" L  5x15" L   Std abd stretch w/ cane   R UE 2x10 w/ left trunk lean at end range 1x10 5"        Mechanical Assessment     Shoulder ER     YTB 2x10   Supine shoulder flexion with resistance     2x10 YTB   Prone T/Y     2x10   Ther Activity                        Gait Training                        Modalities                            · Access Code: [email or web address]  · URL: [email or web address]  · Date: 08/10/2022  · Prepared by: April Whitehead  ·   · Exercises  · Seated Thoracic Lumbar Extension - 3-4 x daily - 7 x weekly - 3 sets - 10 reps  · Wall [place] - 2 x daily - 7 x weekly - 2 sets - 10 reps  · Horizontal Wall Walk with Resistance - 1 x daily - 7 x weekly - 2 sets - 10 reps  · Shoulder External Rotation and Scapular Retraction with Resistance - 1 x daily - 7 x weekly - 2 sets - 10 reps  · Standing Shoulder Row with Anchored Resistance - 1 x daily - 7 x weekly - 2 sets - 10 reps  · Single Arm Shoulder Extension with Anchored Resistance - 1 x daily - 7 x weekly - 2 sets - 10 reps  · Prone Single Arm Shoulder Y - 1 x daily - 7 x weekly - 2 sets - 10 reps  · Prone Shoulder Horizontal Abduction - 1 x daily - 7 x weekly - 2 sets - 10 reps  ·

## 2022-08-11 ENCOUNTER — APPOINTMENT (OUTPATIENT)
Dept: PHYSICAL THERAPY | Facility: CLINIC | Age: 37
End: 2022-08-11
Payer: COMMERCIAL

## 2022-08-17 ENCOUNTER — APPOINTMENT (OUTPATIENT)
Dept: PHYSICAL THERAPY | Facility: CLINIC | Age: 37
End: 2022-08-17
Payer: COMMERCIAL

## 2022-08-18 ENCOUNTER — APPOINTMENT (OUTPATIENT)
Dept: PHYSICAL THERAPY | Facility: CLINIC | Age: 37
End: 2022-08-18
Payer: COMMERCIAL

## 2022-08-24 ENCOUNTER — APPOINTMENT (OUTPATIENT)
Dept: PHYSICAL THERAPY | Facility: CLINIC | Age: 37
End: 2022-08-24
Payer: COMMERCIAL

## 2022-08-25 ENCOUNTER — APPOINTMENT (OUTPATIENT)
Dept: PHYSICAL THERAPY | Facility: CLINIC | Age: 37
End: 2022-08-25
Payer: COMMERCIAL

## 2022-08-31 ENCOUNTER — APPOINTMENT (OUTPATIENT)
Dept: PHYSICAL THERAPY | Facility: CLINIC | Age: 37
End: 2022-08-31
Payer: COMMERCIAL

## 2022-10-11 PROBLEM — H61.22 LEFT EAR IMPACTED CERUMEN: Status: RESOLVED | Noted: 2022-03-28 | Resolved: 2022-10-11

## 2025-01-15 ENCOUNTER — OFFICE VISIT (OUTPATIENT)
Dept: GASTROENTEROLOGY | Facility: CLINIC | Age: 40
End: 2025-01-15
Payer: COMMERCIAL

## 2025-01-15 VITALS
WEIGHT: 128.6 LBS | DIASTOLIC BLOOD PRESSURE: 83 MMHG | HEART RATE: 99 BPM | BODY MASS INDEX: 21.95 KG/M2 | SYSTOLIC BLOOD PRESSURE: 126 MMHG | HEIGHT: 64 IN

## 2025-01-15 DIAGNOSIS — K21.9 GASTROESOPHAGEAL REFLUX DISEASE, UNSPECIFIED WHETHER ESOPHAGITIS PRESENT: Primary | ICD-10-CM

## 2025-01-15 DIAGNOSIS — R19.5 POSITIVE FIT (FECAL IMMUNOCHEMICAL TEST): ICD-10-CM

## 2025-01-15 PROCEDURE — 99204 OFFICE O/P NEW MOD 45 MIN: CPT | Performed by: INTERNAL MEDICINE

## 2025-01-15 RX ORDER — SODIUM CHLORIDE, SODIUM LACTATE, POTASSIUM CHLORIDE, CALCIUM CHLORIDE 600; 310; 30; 20 MG/100ML; MG/100ML; MG/100ML; MG/100ML
125 INJECTION, SOLUTION INTRAVENOUS CONTINUOUS
Status: CANCELLED | OUTPATIENT
Start: 2025-01-15

## 2025-01-15 RX ORDER — TIMOLOL MALEATE 5 MG/ML
1 SOLUTION/ DROPS OPHTHALMIC DAILY
COMMUNITY
Start: 2025-01-08

## 2025-01-15 RX ORDER — RIMEGEPANT SULFATE 75 MG/75MG
75 TABLET, ORALLY DISINTEGRATING ORAL EVERY OTHER DAY
COMMUNITY
Start: 2024-12-14

## 2025-01-15 NOTE — PROGRESS NOTES
Name: Denia Hickey      : 1985      MRN: [id number]  Encounter Provider: Pratik Leo MD  Encounter Date: 1/15/2025   Encounter department: [facility]  :  Assessment & Plan  Gastroesophageal reflux disease, unspecified whether esophagitis present  -Reports symptoms been ongoing for 6 months, have improved since being on a low FODMAP diet and avoiding gluten products.  -Recommend checking celiac serologies after 2 weeks of gluten challenge.  -Patient was explained about the lifestyle and dietary modifications.  Advised to avoid fatty foods, chocolates, caffeine, alcohol, and any other triggering foods.  Avoid eating for at least 3 hours before going to bed.  -Minimize the use of NSAIDs.  -Will plan for EGD especially in setting of positive fit test and new symptoms of acid reflux.    Orders:    Celiac Disease Panel; Future    CBC and differential; Future    Comprehensive metabolic panel; Future    EGD; Future    Positive FIT (fecal immunochemical test)  -Has been having vague symptoms of abdominal bloating, generalized abdominal pain.  While I suspect symptoms may be due to IBS/SIBO, given positive fit test, would recommend bidirectional endoscopy to rule out any inflammatory bowel disease/colorectal polyps/lesions/peptic ulcer disease.  -I obtained informed consent from the patient. The risks/benefits/alternatives of the procedure were discussed with the patient. Risks included, but not limited to, infection, bleeding, perforation, injury to organs in the abdomen, missed lesion and incomplete procedure were discussed. Patient was agreeable and electronic consent was signed.  -Recommend MiraLAX/Dulcolax bowel prep.  Orders:    Colonoscopy; Future    EGD; Future        History of Present Illness   HPI  Denia Hickey is a 39 y.o. female with history of chronic GERD, presents for initial evaluation.  Patient reports that the acid reflux has been waking her up at  "night.  Patient also reports abdominal bloating and abdominal discomfort.  Patient reports that she saw a functional doctor online via Zoom and underwent GI map testing which tested positive for FIT and blastomycosis.  Patient reports that she has now been following a low FODMAP diet and has noted that symptoms have improved.  Patient reports that she tried reintroducing bread, onions and garlic and realized that her symptoms of acid reflux and bloating worsened.  She has never been tested for celiac disease.  She does report occasional use of NSAIDs.  She denies any melena or hematochezia.  Patient denies any family history of GI malignancy.    She has never had an EGD or colonoscopy.  Patient reports that she underwent blood work for her PCP however results are not available in chart and patient does not have them on her phone.  Patient did bring results her for GI mapping test with her.  It did show positive fit test, and also tested positive for blastomycosis.        Review of Systems   Constitutional: Negative.    HENT: Negative.     Eyes: Negative.    Respiratory: Negative.     Cardiovascular: Negative.    Gastrointestinal:         See HPI.   Endocrine: Negative.    Genitourinary: Negative.    Musculoskeletal: Negative.    Skin: Negative.    Allergic/Immunologic: Negative.    Neurological: Negative.    Hematological: Negative.    Psychiatric/Behavioral: Negative.     All other systems reviewed and are negative.         Objective   /83 (BP Location: Right arm, Patient Position: Sitting, Cuff Size: Standard)   Pulse 99   Ht 5' 4\" (1.626 m)   Wt 58.3 kg (128 lb 9.6 oz)   BMI 22.07 kg/m²      Physical Exam  Vitals and nursing note reviewed.   Constitutional:       General: She is not in acute distress.     Appearance: She is well-developed.   HENT:      Head: Normocephalic and atraumatic.   Eyes:      General: No scleral icterus.     Conjunctiva/sclera: Conjunctivae normal.   Cardiovascular:      Rate " and Rhythm: Normal rate and regular rhythm.      Heart sounds: No murmur heard.  Pulmonary:      Effort: Pulmonary effort is normal. No respiratory distress.      Breath sounds: Normal breath sounds.   Abdominal:      Palpations: Abdomen is soft.      Tenderness: There is no abdominal tenderness.   Musculoskeletal:         General: No swelling.      Cervical back: Neck supple.   Skin:     General: Skin is warm and dry.   Neurological:      Mental Status: She is alert.   Psychiatric:         Mood and Affect: Mood normal.

## 2025-01-15 NOTE — ASSESSMENT & PLAN NOTE
-Reports symptoms been ongoing for 6 months, have improved since being on a low FODMAP diet and avoiding gluten products.  -Recommend checking celiac serologies after 2 weeks of gluten challenge.  -Patient was explained about the lifestyle and dietary modifications.  Advised to avoid fatty foods, chocolates, caffeine, alcohol, and any other triggering foods.  Avoid eating for at least 3 hours before going to bed.  -Minimize the use of NSAIDs.  -Will plan for EGD especially in setting of positive fit test and new symptoms of acid reflux.    Orders:    Celiac Disease Panel; Future    CBC and differential; Future    Comprehensive metabolic panel; Future    EGD; Future

## 2025-01-15 NOTE — H&P (VIEW-ONLY)
Name: Denia Hickey      : 1985      MRN: [id number]  Encounter Provider: Pratik Leo MD  Encounter Date: 1/15/2025   Encounter department: [facility]  :  Assessment & Plan  Gastroesophageal reflux disease, unspecified whether esophagitis present  -Reports symptoms been ongoing for 6 months, have improved since being on a low FODMAP diet and avoiding gluten products.  -Recommend checking celiac serologies after 2 weeks of gluten challenge.  -Patient was explained about the lifestyle and dietary modifications.  Advised to avoid fatty foods, chocolates, caffeine, alcohol, and any other triggering foods.  Avoid eating for at least 3 hours before going to bed.  -Minimize the use of NSAIDs.  -Will plan for EGD especially in setting of positive fit test and new symptoms of acid reflux.    Orders:    Celiac Disease Panel; Future    CBC and differential; Future    Comprehensive metabolic panel; Future    EGD; Future    Positive FIT (fecal immunochemical test)  -Has been having vague symptoms of abdominal bloating, generalized abdominal pain.  While I suspect symptoms may be due to IBS/SIBO, given positive fit test, would recommend bidirectional endoscopy to rule out any inflammatory bowel disease/colorectal polyps/lesions/peptic ulcer disease.  -I obtained informed consent from the patient. The risks/benefits/alternatives of the procedure were discussed with the patient. Risks included, but not limited to, infection, bleeding, perforation, injury to organs in the abdomen, missed lesion and incomplete procedure were discussed. Patient was agreeable and electronic consent was signed.  -Recommend MiraLAX/Dulcolax bowel prep.  Orders:    Colonoscopy; Future    EGD; Future        History of Present Illness   HPI  Denia Hickey is a 39 y.o. female with history of chronic GERD, presents for initial evaluation.  Patient reports that the acid reflux has been waking her up at  "night.  Patient also reports abdominal bloating and abdominal discomfort.  Patient reports that she saw a functional doctor online via Zoom and underwent GI map testing which tested positive for FIT and blastomycosis.  Patient reports that she has now been following a low FODMAP diet and has noted that symptoms have improved.  Patient reports that she tried reintroducing bread, onions and garlic and realized that her symptoms of acid reflux and bloating worsened.  She has never been tested for celiac disease.  She does report occasional use of NSAIDs.  She denies any melena or hematochezia.  Patient denies any family history of GI malignancy.    She has never had an EGD or colonoscopy.  Patient reports that she underwent blood work for her PCP however results are not available in chart and patient does not have them on her phone.  Patient did bring results her for GI mapping test with her.  It did show positive fit test, and also tested positive for blastomycosis.        Review of Systems   Constitutional: Negative.    HENT: Negative.     Eyes: Negative.    Respiratory: Negative.     Cardiovascular: Negative.    Gastrointestinal:         See HPI.   Endocrine: Negative.    Genitourinary: Negative.    Musculoskeletal: Negative.    Skin: Negative.    Allergic/Immunologic: Negative.    Neurological: Negative.    Hematological: Negative.    Psychiatric/Behavioral: Negative.     All other systems reviewed and are negative.         Objective   /83 (BP Location: Right arm, Patient Position: Sitting, Cuff Size: Standard)   Pulse 99   Ht 5' 4\" (1.626 m)   Wt 58.3 kg (128 lb 9.6 oz)   BMI 22.07 kg/m²      Physical Exam  Vitals and nursing note reviewed.   Constitutional:       General: She is not in acute distress.     Appearance: She is well-developed.   HENT:      Head: Normocephalic and atraumatic.   Eyes:      General: No scleral icterus.     Conjunctiva/sclera: Conjunctivae normal.   Cardiovascular:      Rate " and Rhythm: Normal rate and regular rhythm.      Heart sounds: No murmur heard.  Pulmonary:      Effort: Pulmonary effort is normal. No respiratory distress.      Breath sounds: Normal breath sounds.   Abdominal:      Palpations: Abdomen is soft.      Tenderness: There is no abdominal tenderness.   Musculoskeletal:         General: No swelling.      Cervical back: Neck supple.   Skin:     General: Skin is warm and dry.   Neurological:      Mental Status: She is alert.   Psychiatric:         Mood and Affect: Mood normal.

## 2025-01-15 NOTE — ASSESSMENT & PLAN NOTE
-Has been having vague symptoms of abdominal bloating, generalized abdominal pain.  While I suspect symptoms may be due to IBS/SIBO, given positive fit test, would recommend bidirectional endoscopy to rule out any inflammatory bowel disease/colorectal polyps/lesions/peptic ulcer disease.  -I obtained informed consent from the patient. The risks/benefits/alternatives of the procedure were discussed with the patient. Risks included, but not limited to, infection, bleeding, perforation, injury to organs in the abdomen, missed lesion and incomplete procedure were discussed. Patient was agreeable and electronic consent was signed.  -Recommend MiraLAX/Dulcolax bowel prep.  Orders:    Colonoscopy; Future    EGD; Future

## 2025-01-24 ENCOUNTER — HOSPITAL ENCOUNTER (OUTPATIENT)
Dept: GASTROENTEROLOGY | Facility: AMBULARY SURGERY CENTER | Age: 40
Setting detail: OUTPATIENT SURGERY
End: 2025-01-24
Attending: INTERNAL MEDICINE
Payer: COMMERCIAL

## 2025-01-24 ENCOUNTER — ANESTHESIA EVENT (OUTPATIENT)
Dept: GASTROENTEROLOGY | Facility: AMBULARY SURGERY CENTER | Age: 40
End: 2025-01-24
Payer: COMMERCIAL

## 2025-01-24 VITALS
WEIGHT: 128 LBS | OXYGEN SATURATION: 100 % | HEIGHT: 64 IN | DIASTOLIC BLOOD PRESSURE: 70 MMHG | BODY MASS INDEX: 21.85 KG/M2 | RESPIRATION RATE: 18 BRPM | HEART RATE: 74 BPM | SYSTOLIC BLOOD PRESSURE: 109 MMHG | TEMPERATURE: 98.1 F

## 2025-01-24 DIAGNOSIS — K21.9 GASTROESOPHAGEAL REFLUX DISEASE, UNSPECIFIED WHETHER ESOPHAGITIS PRESENT: ICD-10-CM

## 2025-01-24 DIAGNOSIS — R19.5 POSITIVE FIT (FECAL IMMUNOCHEMICAL TEST): ICD-10-CM

## 2025-01-24 LAB
ALBUMIN SERPL-MCNC: 4.5 G/DL (ref 3.9–4.9)
ALP SERPL-CCNC: 64 IU/L (ref 44–121)
ALT SERPL-CCNC: 13 IU/L (ref 0–32)
AST SERPL-CCNC: 15 IU/L (ref 0–40)
BASOPHILS # BLD AUTO: 0.1 X10E3/UL (ref 0–0.2)
BASOPHILS NFR BLD AUTO: 1 %
BILIRUB SERPL-MCNC: 0.4 MG/DL (ref 0–1.2)
BUN SERPL-MCNC: 17 MG/DL (ref 6–24)
BUN/CREAT SERPL: 19 (ref 9–23)
CALCIUM SERPL-MCNC: 8.9 MG/DL (ref 8.7–10.2)
CHLORIDE SERPL-SCNC: 105 MMOL/L (ref 96–106)
CO2 SERPL-SCNC: 23 MMOL/L (ref 20–29)
CREAT SERPL-MCNC: 0.89 MG/DL (ref 0.57–1)
EGFR: 83 ML/MIN/1.73
ENDOMYSIUM IGA SER QL: NEGATIVE
EOSINOPHIL # BLD AUTO: 0.1 X10E3/UL (ref 0–0.4)
EOSINOPHIL NFR BLD AUTO: 1 %
ERYTHROCYTE [DISTWIDTH] IN BLOOD BY AUTOMATED COUNT: 11.8 % (ref 11.7–15.4)
EXT PREGNANCY TEST URINE: NEGATIVE
EXT. CONTROL: NORMAL
GLOBULIN SER-MCNC: 2.3 G/DL (ref 1.5–4.5)
GLUCOSE SERPL-MCNC: 94 MG/DL (ref 70–99)
HCT VFR BLD AUTO: 44.1 % (ref 34–46.6)
HGB BLD-MCNC: 14.2 G/DL (ref 11.1–15.9)
IGA SERPL-MCNC: 163 MG/DL (ref 87–352)
IMM GRANULOCYTES # BLD: 0 X10E3/UL (ref 0–0.1)
IMM GRANULOCYTES NFR BLD: 0 %
LYMPHOCYTES # BLD AUTO: 1.8 X10E3/UL (ref 0.7–3.1)
LYMPHOCYTES NFR BLD AUTO: 31 %
MCH RBC QN AUTO: 30.8 PG (ref 26.6–33)
MCHC RBC AUTO-ENTMCNC: 32.2 G/DL (ref 31.5–35.7)
MCV RBC AUTO: 96 FL (ref 79–97)
MONOCYTES # BLD AUTO: 0.4 X10E3/UL (ref 0.1–0.9)
MONOCYTES NFR BLD AUTO: 7 %
NEUTROPHILS # BLD AUTO: 3.6 X10E3/UL (ref 1.4–7)
NEUTROPHILS NFR BLD AUTO: 60 %
PLATELET # BLD AUTO: 252 X10E3/UL (ref 150–450)
POTASSIUM SERPL-SCNC: 4.4 MMOL/L (ref 3.5–5.2)
PROT SERPL-MCNC: 6.8 G/DL (ref 6–8.5)
RBC # BLD AUTO: 4.61 X10E6/UL (ref 3.77–5.28)
SODIUM SERPL-SCNC: 143 MMOL/L (ref 134–144)
TTG IGA SER-ACNC: <2 U/ML (ref 0–3)
WBC # BLD AUTO: 5.9 X10E3/UL (ref 3.4–10.8)

## 2025-01-24 PROCEDURE — 81025 URINE PREGNANCY TEST: CPT | Performed by: INTERNAL MEDICINE

## 2025-01-24 PROCEDURE — 88305 TISSUE EXAM BY PATHOLOGIST: CPT | Performed by: PATHOLOGY

## 2025-01-24 RX ORDER — PROPOFOL 10 MG/ML
INJECTION, EMULSION INTRAVENOUS CONTINUOUS PRN
Status: DISCONTINUED | OUTPATIENT
Start: 2025-01-24 | End: 2025-01-24

## 2025-01-24 RX ORDER — LIDOCAINE HYDROCHLORIDE 10 MG/ML
INJECTION, SOLUTION EPIDURAL; INFILTRATION; INTRACAUDAL; PERINEURAL AS NEEDED
Status: DISCONTINUED | OUTPATIENT
Start: 2025-01-24 | End: 2025-01-24

## 2025-01-24 RX ORDER — MULTIVITAMIN
1 CAPSULE ORAL DAILY
COMMUNITY

## 2025-01-24 RX ORDER — OMEPRAZOLE 40 MG/1
40 CAPSULE, DELAYED RELEASE ORAL DAILY
Qty: 90 CAPSULE | Refills: 0 | Status: SHIPPED | OUTPATIENT
Start: 2025-01-24 | End: 2025-04-24

## 2025-01-24 RX ORDER — SODIUM CHLORIDE, SODIUM LACTATE, POTASSIUM CHLORIDE, CALCIUM CHLORIDE 600; 310; 30; 20 MG/100ML; MG/100ML; MG/100ML; MG/100ML
125 INJECTION, SOLUTION INTRAVENOUS CONTINUOUS
Status: DISPENSED | OUTPATIENT
Start: 2025-01-24

## 2025-01-24 RX ORDER — PROPOFOL 10 MG/ML
INJECTION, EMULSION INTRAVENOUS AS NEEDED
Status: DISCONTINUED | OUTPATIENT
Start: 2025-01-24 | End: 2025-01-24

## 2025-01-24 RX ORDER — SODIUM CHLORIDE, SODIUM LACTATE, POTASSIUM CHLORIDE, CALCIUM CHLORIDE 600; 310; 30; 20 MG/100ML; MG/100ML; MG/100ML; MG/100ML
INJECTION, SOLUTION INTRAVENOUS CONTINUOUS PRN
Status: DISCONTINUED | OUTPATIENT
Start: 2025-01-24 | End: 2025-01-24

## 2025-01-24 RX ADMIN — PROPOFOL 150 MCG/KG/MIN: 10 INJECTION, EMULSION INTRAVENOUS at 14:56

## 2025-01-24 RX ADMIN — SODIUM CHLORIDE, SODIUM LACTATE, POTASSIUM CHLORIDE, CALCIUM CHLORIDE: 600; 310; 30; 20 INJECTION, SOLUTION INTRAVENOUS at 14:27

## 2025-01-24 RX ADMIN — PROPOFOL 50 MG: 10 INJECTION, EMULSION INTRAVENOUS at 14:53

## 2025-01-24 RX ADMIN — SODIUM CHLORIDE, SODIUM LACTATE, POTASSIUM CHLORIDE, AND CALCIUM CHLORIDE 125 ML/HR: .6; .31; .03; .02 INJECTION, SOLUTION INTRAVENOUS at 14:04

## 2025-01-24 RX ADMIN — LIDOCAINE HYDROCHLORIDE 50 MG: 10 INJECTION, SOLUTION EPIDURAL; INFILTRATION; INTRACAUDAL; PERINEURAL at 14:50

## 2025-01-24 RX ADMIN — PROPOFOL 50 MG: 10 INJECTION, EMULSION INTRAVENOUS at 14:56

## 2025-01-24 RX ADMIN — PROPOFOL 200 MG: 10 INJECTION, EMULSION INTRAVENOUS at 14:50

## 2025-01-24 NOTE — ANESTHESIA PREPROCEDURE EVALUATION
Procedure:  COLONOSCOPY  EGD    Relevant Problems   GI/HEPATIC   (+) Gastroesophageal reflux disease        Physical Exam    Airway    Mallampati score: II  TM Distance: >3 FB  Neck ROM: full     Dental   No notable dental hx     Cardiovascular  Cardiovascular exam normal    Pulmonary  Pulmonary exam normal     Other Findings  post-pubertal.      Anesthesia Plan  ASA Score- 2     Anesthesia Type- IV sedation with anesthesia with ASA Monitors.         Additional Monitors:     Airway Plan:            Plan Factors-Exercise tolerance (METS): >4 METS.    Chart reviewed.   Existing labs reviewed. Patient summary reviewed.    Patient is not a current smoker.              Induction-     Postoperative Plan-     Perioperative Resuscitation Plan - Level 1 - Full Code.       Informed Consent- Anesthetic plan and risks discussed with patient.  I personally reviewed this patient with the CRNA. Discussed and agreed on the Anesthesia Plan with the CRNA..      NPO Status:  No vitals data found for the desired time range.

## 2025-01-24 NOTE — ANESTHESIA POSTPROCEDURE EVALUATION
Post-Op Assessment Note    CV Status:  Stable  Pain Score: 0    Pain management: adequate       Mental Status:  Sleepy   Hydration Status:  Euvolemic   PONV Controlled:  Controlled   Airway Patency:  Patent     Post Op Vitals Reviewed: Yes    No anethesia notable event occurred.    Staff: Anesthesiologist, CRNA           Last Filed PACU Vitals:  Vitals Value Taken Time   Temp     Pulse 83 01/24/25 1513   BP 99/55 01/24/25 1513   Resp 12 01/24/25 1513   SpO2 100 % 01/24/25 1513

## 2025-01-24 NOTE — INTERVAL H&P NOTE
H&P reviewed. After examining the patient I find no changes in the patients condition since the H&P had been written.    Vitals:    01/24/25 1349   BP: 118/71   Pulse: 80   Resp: 18   Temp: 98.1 °F (36.7 °C)   SpO2: 99%

## 2025-01-27 ENCOUNTER — OFFICE VISIT (OUTPATIENT)
Dept: OTOLARYNGOLOGY | Facility: CLINIC | Age: 40
End: 2025-01-27
Payer: COMMERCIAL

## 2025-01-27 ENCOUNTER — RESULTS FOLLOW-UP (OUTPATIENT)
Dept: GASTROENTEROLOGY | Facility: AMBULARY SURGERY CENTER | Age: 40
End: 2025-01-27

## 2025-01-27 VITALS — WEIGHT: 128 LBS | HEIGHT: 64 IN | TEMPERATURE: 98.2 F | BODY MASS INDEX: 21.85 KG/M2

## 2025-01-27 DIAGNOSIS — H61.23 BILATERAL IMPACTED CERUMEN: Primary | ICD-10-CM

## 2025-01-27 PROCEDURE — 69210 REMOVE IMPACTED EAR WAX UNI: CPT | Performed by: NURSE PRACTITIONER

## 2025-01-27 PROCEDURE — 99213 OFFICE O/P EST LOW 20 MIN: CPT | Performed by: NURSE PRACTITIONER

## 2025-01-27 NOTE — PROGRESS NOTES
Assessment/Plan:      Diagnoses and all orders for this visit:    Bilateral impacted cerumen    Other orders  -     Ear cerumen removal            On exam noted bilateral cerumen impaction and unable to fully view tympanic membrane.  Cerumen impaction removed bilateral eac with irrigation, alligator forceps and suction, pt tolerated procedure well.  Upon removal, improved hearing and decreased clogged sensation of bilateral ears.  Discussed routine cerumen care including avoidance of q-tips, may use cerumen softeners every one to two months.  Hydrocortisone cream pea sized amount on finger as needed for itching in ears.  Encourage ongoing follow up annually to monitor for cerumen and hearing.  Audiogram if symptoms worsen.      Subjective:     Patient ID: Denia Hickey is a 39 y.o. female.    Presents today as a follow up due to ear concerns.  Hearing gradually worsening.  Bilateral ears feel blocked. During routine PCP visit informed of cerumen in ears. No tinnitus.  Right more than left otalgia. No otorrhea.  No history of ear surgery.  No current hearing aids.            Review of Systems   Constitutional: Negative.    HENT:  Negative for congestion, ear discharge, ear pain, hearing loss, nosebleeds, postnasal drip, rhinorrhea, sinus pressure, sinus pain, sore throat, tinnitus and voice change.    Respiratory:  Negative for chest tightness and shortness of breath.    Skin:  Negative for color change.   Neurological:  Negative for dizziness, numbness and headaches.   Psychiatric/Behavioral: Negative.           Objective:     Physical Exam  Constitutional:       Appearance: She is well-developed.   HENT:      Head: Normocephalic.      Right Ear: Hearing, tympanic membrane, ear canal and external ear normal. No decreased hearing noted. No drainage or tenderness. There is impacted cerumen. Tympanic membrane is not perforated or erythematous.      Left Ear: Hearing, tympanic membrane, ear canal and external ear  normal. No decreased hearing noted. No drainage or tenderness. There is impacted cerumen. Tympanic membrane is not perforated or erythematous.      Nose: Nose normal. No nasal deformity or septal deviation.      Mouth/Throat:      Mouth: Mucous membranes are not pale and not dry. No oral lesions.      Dentition: Normal dentition.      Pharynx: Uvula midline. No oropharyngeal exudate.   Neck:      Trachea: No tracheal deviation.   Pulmonary:      Effort: Pulmonary effort is normal. No accessory muscle usage or respiratory distress.   Musculoskeletal:      Cervical back: Full passive range of motion without pain and neck supple.   Lymphadenopathy:      Cervical: No cervical adenopathy.   Skin:     General: Skin is warm and dry.   Neurological:      Mental Status: She is alert and oriented to person, place, and time.      Cranial Nerves: No cranial nerve deficit.      Sensory: No sensory deficit.   Psychiatric:         Behavior: Behavior is cooperative.         Ear cerumen removal    Date/Time: 1/27/2025 2:00 PM    Performed by: CAROLINA Branham  Authorized by: CAROLINA Branham  Universal Protocol:  procedure performed by consultantConsent: Verbal consent obtained.  Risks and benefits: risks, benefits and alternatives were discussed  Consent given by: patient  Patient understanding: patient states understanding of the procedure being performed    Patient location:  Clinic  Procedure details:     Local anesthetic:  None    Location:  L ear and R ear    Approach:  External  Post-procedure details:     Complication:  None    Hearing quality:  Normal    Patient tolerance of procedure:  Tolerated well, no immediate complications

## 2025-01-28 PROCEDURE — 88305 TISSUE EXAM BY PATHOLOGIST: CPT | Performed by: PATHOLOGY

## 2025-01-31 ENCOUNTER — RESULTS FOLLOW-UP (OUTPATIENT)
Age: 40
End: 2025-01-31

## 2025-04-16 DIAGNOSIS — K21.9 GASTROESOPHAGEAL REFLUX DISEASE, UNSPECIFIED WHETHER ESOPHAGITIS PRESENT: ICD-10-CM

## 2025-04-16 RX ORDER — OMEPRAZOLE 40 MG/1
40 CAPSULE, DELAYED RELEASE ORAL DAILY
Qty: 90 CAPSULE | Refills: 1 | Status: SHIPPED | OUTPATIENT
Start: 2025-04-16